# Patient Record
Sex: MALE | Race: WHITE | NOT HISPANIC OR LATINO | Employment: FULL TIME | URBAN - METROPOLITAN AREA
[De-identification: names, ages, dates, MRNs, and addresses within clinical notes are randomized per-mention and may not be internally consistent; named-entity substitution may affect disease eponyms.]

---

## 2019-12-09 RX ORDER — SODIUM, POTASSIUM,MAG SULFATES 17.5-3.13G
2 SOLUTION, RECONSTITUTED, ORAL ORAL TAKE AS DIRECTED
Qty: 354 ML | Refills: 0 | Status: SHIPPED | OUTPATIENT
Start: 2019-12-09 | End: 2019-12-10 | Stop reason: SDUPTHER

## 2019-12-10 RX ORDER — SODIUM, POTASSIUM,MAG SULFATES 17.5-3.13G
2 SOLUTION, RECONSTITUTED, ORAL ORAL TAKE AS DIRECTED
Qty: 354 ML | Refills: 0 | Status: SHIPPED | OUTPATIENT
Start: 2019-12-10

## 2019-12-20 ENCOUNTER — LAB REQUISITION (OUTPATIENT)
Dept: LAB | Facility: HOSPITAL | Age: 54
End: 2019-12-20

## 2019-12-20 ENCOUNTER — OUTSIDE FACILITY SERVICE (OUTPATIENT)
Dept: GASTROENTEROLOGY | Facility: CLINIC | Age: 54
End: 2019-12-20

## 2019-12-20 DIAGNOSIS — Z12.11 ENCOUNTER FOR SCREENING FOR MALIGNANT NEOPLASM OF COLON: ICD-10-CM

## 2019-12-20 PROCEDURE — 45385 COLONOSCOPY W/LESION REMOVAL: CPT | Performed by: INTERNAL MEDICINE

## 2019-12-20 PROCEDURE — 88305 TISSUE EXAM BY PATHOLOGIST: CPT | Performed by: INTERNAL MEDICINE

## 2019-12-23 LAB
CYTO UR: NORMAL
LAB AP CASE REPORT: NORMAL
LAB AP CLINICAL INFORMATION: NORMAL
PATH REPORT.FINAL DX SPEC: NORMAL
PATH REPORT.GROSS SPEC: NORMAL

## 2021-05-17 VITALS
BODY MASS INDEX: 33.86 KG/M2 | RESPIRATION RATE: 17 BRPM | TEMPERATURE: 99.5 F | WEIGHT: 250 LBS | OXYGEN SATURATION: 95 % | DIASTOLIC BLOOD PRESSURE: 122 MMHG | HEIGHT: 72 IN | HEART RATE: 78 BPM | SYSTOLIC BLOOD PRESSURE: 188 MMHG

## 2021-05-17 PROCEDURE — 99283 EMERGENCY DEPT VISIT LOW MDM: CPT

## 2021-05-17 RX ORDER — ASPIRIN 81 MG/1
81 TABLET, CHEWABLE ORAL DAILY
COMMUNITY

## 2021-05-18 ENCOUNTER — HOSPITAL ENCOUNTER (EMERGENCY)
Facility: HOSPITAL | Age: 56
Discharge: STILL ADMITTED OR ADMINISTRATIVE | End: 2021-05-18

## 2021-05-18 ENCOUNTER — HOSPITAL ENCOUNTER (OUTPATIENT)
Dept: CARDIOLOGY | Facility: HOSPITAL | Age: 56
Discharge: HOME | End: 2021-05-18
Admitting: PHYSICIAN ASSISTANT

## 2021-05-18 ENCOUNTER — HOSPITAL ENCOUNTER (EMERGENCY)
Facility: HOSPITAL | Age: 56
Discharge: HOME | End: 2021-05-18
Attending: EMERGENCY MEDICINE | Admitting: EMERGENCY MEDICINE

## 2021-05-18 DIAGNOSIS — Z86.718 HISTORY OF DVT (DEEP VEIN THROMBOSIS): ICD-10-CM

## 2021-05-18 DIAGNOSIS — M79.661 PAIN AND SWELLING OF RIGHT LOWER LEG: ICD-10-CM

## 2021-05-18 DIAGNOSIS — M79.89 PAIN AND SWELLING OF RIGHT LOWER LEG: ICD-10-CM

## 2021-05-18 DIAGNOSIS — M79.89 PAIN AND SWELLING OF RIGHT LOWER LEG: Primary | ICD-10-CM

## 2021-05-18 DIAGNOSIS — M79.661 PAIN AND SWELLING OF RIGHT LOWER LEG: Primary | ICD-10-CM

## 2021-05-18 LAB
BH CV LOWER VASCULAR LEFT COMMON FEMORAL AUGMENT: NORMAL
BH CV LOWER VASCULAR LEFT COMMON FEMORAL COMPRESS: NORMAL
BH CV LOWER VASCULAR LEFT COMMON FEMORAL PHASIC: NORMAL
BH CV LOWER VASCULAR LEFT COMMON FEMORAL SPONT: NORMAL
BH CV LOWER VASCULAR LEFT PROFUNDA FEMORAL AUGMENT: NORMAL
BH CV LOWER VASCULAR LEFT PROFUNDA FEMORAL COMPRESS: NORMAL
BH CV LOWER VASCULAR LEFT PROFUNDA FEMORAL PHASIC: NORMAL
BH CV LOWER VASCULAR LEFT PROFUNDA FEMORAL SPONT: NORMAL
BH CV LOWER VASCULAR RIGHT COMMON FEMORAL AUGMENT: NORMAL
BH CV LOWER VASCULAR RIGHT COMMON FEMORAL COMPRESS: NORMAL
BH CV LOWER VASCULAR RIGHT COMMON FEMORAL PHASIC: NORMAL
BH CV LOWER VASCULAR RIGHT COMMON FEMORAL SPONT: NORMAL
BH CV LOWER VASCULAR RIGHT DISTAL FEMORAL AUGMENT: NORMAL
BH CV LOWER VASCULAR RIGHT DISTAL FEMORAL COMPRESS: NORMAL
BH CV LOWER VASCULAR RIGHT DISTAL FEMORAL PHASIC: NORMAL
BH CV LOWER VASCULAR RIGHT DISTAL FEMORAL SPONT: NORMAL
BH CV LOWER VASCULAR RIGHT EXTERNAL ILIAC AUGMENT: NORMAL
BH CV LOWER VASCULAR RIGHT EXTERNAL ILIAC COMPETENT: NORMAL
BH CV LOWER VASCULAR RIGHT EXTERNAL ILIAC COMPRESS: NORMAL
BH CV LOWER VASCULAR RIGHT EXTERNAL ILIAC PHASIC: NORMAL
BH CV LOWER VASCULAR RIGHT EXTERNAL ILIAC SPONT: NORMAL
BH CV LOWER VASCULAR RIGHT GASTRONEMIUS COMPRESS: NORMAL
BH CV LOWER VASCULAR RIGHT GREATER SAPH AK COMPRESS: NORMAL
BH CV LOWER VASCULAR RIGHT GREATER SAPH BK COMPRESS: NORMAL
BH CV LOWER VASCULAR RIGHT LESSER SAPH COMPRESS: NORMAL
BH CV LOWER VASCULAR RIGHT MID FEMORAL AUGMENT: NORMAL
BH CV LOWER VASCULAR RIGHT MID FEMORAL COMPRESS: NORMAL
BH CV LOWER VASCULAR RIGHT MID FEMORAL PHASIC: NORMAL
BH CV LOWER VASCULAR RIGHT MID FEMORAL SPONT: NORMAL
BH CV LOWER VASCULAR RIGHT PERONEAL COMPRESS: NORMAL
BH CV LOWER VASCULAR RIGHT POPLITEAL AUGMENT: NORMAL
BH CV LOWER VASCULAR RIGHT POPLITEAL COMPRESS: NORMAL
BH CV LOWER VASCULAR RIGHT POPLITEAL PHASIC: NORMAL
BH CV LOWER VASCULAR RIGHT POPLITEAL SPONT: NORMAL
BH CV LOWER VASCULAR RIGHT POSTERIOR TIBIAL COMPRESS: NORMAL
BH CV LOWER VASCULAR RIGHT PROFUNDA FEMORAL AUGMENT: NORMAL
BH CV LOWER VASCULAR RIGHT PROFUNDA FEMORAL COMPRESS: NORMAL
BH CV LOWER VASCULAR RIGHT PROFUNDA FEMORAL PHASIC: NORMAL
BH CV LOWER VASCULAR RIGHT PROFUNDA FEMORAL SPONT: NORMAL
BH CV LOWER VASCULAR RIGHT PROXIMAL FEMORAL AUGMENT: NORMAL
BH CV LOWER VASCULAR RIGHT PROXIMAL FEMORAL COMPRESS: NORMAL
BH CV LOWER VASCULAR RIGHT PROXIMAL FEMORAL PHASIC: NORMAL
BH CV LOWER VASCULAR RIGHT PROXIMAL FEMORAL SPONT: NORMAL
BH CV LOWER VASCULAR RIGHT SAPHENOFEMORAL JUNCTION COMPRESS: NORMAL
HOLD SPECIMEN: NORMAL
MAXIMAL PREDICTED HEART RATE: 165 BPM
STRESS TARGET HR: 140 BPM
WHOLE BLOOD HOLD SPECIMEN: NORMAL
WHOLE BLOOD HOLD SPECIMEN: NORMAL

## 2021-05-18 PROCEDURE — 93971 EXTREMITY STUDY: CPT

## 2021-05-18 PROCEDURE — 96372 THER/PROPH/DIAG INJ SC/IM: CPT

## 2021-05-18 PROCEDURE — 25010000002 ENOXAPARIN PER 10 MG: Performed by: PHYSICIAN ASSISTANT

## 2021-05-18 PROCEDURE — 93971 EXTREMITY STUDY: CPT | Performed by: INTERNAL MEDICINE

## 2021-05-18 RX ADMIN — ENOXAPARIN SODIUM 110 MG: 120 INJECTION SUBCUTANEOUS at 01:58

## 2022-02-24 ENCOUNTER — TRANSCRIBE ORDERS (OUTPATIENT)
Dept: ADMINISTRATIVE | Facility: HOSPITAL | Age: 57
End: 2022-02-24
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
## 2022-03-04 ENCOUNTER — HOSPITAL ENCOUNTER (OUTPATIENT)
Dept: ULTRASOUND IMAGING | Facility: HOSPITAL | Age: 57
Discharge: HOME | End: 2022-03-04
Admitting: STUDENT IN AN ORGANIZED HEALTH CARE EDUCATION/TRAINING PROGRAM
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
## 2022-03-04 PROCEDURE — 76882 US LMTD JT/FCL EVL NVASC XTR: CPT

## 2024-12-05 RX ORDER — SODIUM, POTASSIUM,MAG SULFATES 17.5-3.13G
SOLUTION, RECONSTITUTED, ORAL ORAL
Qty: 354 ML | Refills: 0 | Status: SHIPPED | OUTPATIENT
Start: 2024-12-05

## 2024-12-20 ENCOUNTER — OUTSIDE FACILITY SERVICE (OUTPATIENT)
Dept: GASTROENTEROLOGY | Facility: CLINIC | Age: 59
End: 2024-12-20
Payer: COMMERCIAL

## 2024-12-20 PROCEDURE — 45378 DIAGNOSTIC COLONOSCOPY: CPT | Performed by: INTERNAL MEDICINE

## 2025-03-12 ENCOUNTER — TRANSCRIBE ORDERS (OUTPATIENT)
Dept: ADMINISTRATIVE | Facility: HOSPITAL | Age: 60
End: 2025-03-12
Payer: COMMERCIAL

## 2025-03-12 DIAGNOSIS — G47.30 SLEEP APNEA, UNSPECIFIED TYPE: ICD-10-CM

## 2025-03-12 DIAGNOSIS — R06.83 SNORING: Primary | ICD-10-CM

## 2025-03-18 ENCOUNTER — TELEPHONE (OUTPATIENT)
Dept: CARDIOLOGY | Facility: CLINIC | Age: 60
End: 2025-03-18
Payer: COMMERCIAL

## 2025-03-18 NOTE — TELEPHONE ENCOUNTER
Caller: Jesus Pulido    Relationship: Self    Best call back number: [phone]     What is the best time to reach you:  ANYTIME    What was the call regarding:  PT IS WANTING TO KNOW WHEN HE WILL RECEIVE A NEW PT PACKET BEFORE HIS APPOINTMENT.     Is it okay if the provider responds through [employer]: PLEASE CALL PT, THANK YOU     
SPOKE WITH PT TO LET HIM NO THAT THE PAPERWORK WAS SENT OUT ON THUR MORNING MARCH 13TH.   
What Type Of Note Output Would You Prefer (Optional)?: Standard Output
Hpi Title: Evaluation of Skin Lesions
How Severe Are Your Spot(S)?: mild
Have Your Spot(S) Been Treated In The Past?: has not been treated

## 2025-03-25 PROBLEM — M10.9 GOUT, UNSPECIFIED: Status: ACTIVE | Noted: 2025-03-25

## 2025-03-25 PROBLEM — E78.5 HYPERLIPIDEMIA LDL GOAL <70: Status: ACTIVE | Noted: 2025-03-25

## 2025-03-25 PROBLEM — I10 ESSENTIAL HYPERTENSION: Status: ACTIVE | Noted: 2025-03-25

## 2025-03-25 PROBLEM — E78.41 ELEVATED LIPOPROTEIN(A): Status: ACTIVE | Noted: 2025-03-25

## 2025-03-25 PROBLEM — G47.30 SLEEP APNEA: Status: ACTIVE | Noted: 2025-03-25

## 2025-03-25 PROBLEM — Z90.3 HISTORY OF SLEEVE GASTRECTOMY: Status: ACTIVE | Noted: 2025-03-25

## 2025-03-25 PROBLEM — Z86.718 HISTORY OF DVT (DEEP VEIN THROMBOSIS): Status: ACTIVE | Noted: 2025-03-25

## 2025-03-27 ENCOUNTER — SPECIALTY PHARMACY (OUTPATIENT)
Dept: CARDIOLOGY | Facility: CLINIC | Age: 60
End: 2025-03-27
Payer: COMMERCIAL

## 2025-03-27 ENCOUNTER — OFFICE VISIT (OUTPATIENT)
Dept: CARDIOLOGY | Facility: CLINIC | Age: 60
End: 2025-03-27
Payer: COMMERCIAL

## 2025-03-27 ENCOUNTER — TELEPHONE (OUTPATIENT)
Dept: CARDIOLOGY | Facility: CLINIC | Age: 60
End: 2025-03-27

## 2025-03-27 VITALS
SYSTOLIC BLOOD PRESSURE: 120 MMHG | OXYGEN SATURATION: 97 % | WEIGHT: 255 LBS | HEIGHT: 70 IN | HEART RATE: 70 BPM | BODY MASS INDEX: 36.51 KG/M2 | DIASTOLIC BLOOD PRESSURE: 78 MMHG

## 2025-03-27 DIAGNOSIS — E78.2 MIXED HYPERLIPIDEMIA: ICD-10-CM

## 2025-03-27 DIAGNOSIS — E78.41 ELEVATED LIPOPROTEIN(A): Primary | ICD-10-CM

## 2025-03-27 DIAGNOSIS — I10 ESSENTIAL HYPERTENSION: ICD-10-CM

## 2025-03-27 PROCEDURE — 93000 ELECTROCARDIOGRAM COMPLETE: CPT | Performed by: INTERNAL MEDICINE

## 2025-03-27 PROCEDURE — 99204 OFFICE O/P NEW MOD 45 MIN: CPT | Performed by: INTERNAL MEDICINE

## 2025-03-27 RX ORDER — ATORVASTATIN CALCIUM 80 MG/1
40 TABLET, FILM COATED ORAL DAILY
COMMUNITY
Start: 2025-03-10

## 2025-03-27 RX ORDER — LISINOPRIL 10 MG/1
1 TABLET ORAL DAILY
COMMUNITY
Start: 2025-03-10

## 2025-03-27 RX ORDER — MULTIPLE VITAMINS W/ MINERALS TAB 9MG-400MCG
1 TAB ORAL DAILY
COMMUNITY

## 2025-03-27 NOTE — PROGRESS NOTES
OFFICE VISIT  NOTE  [facility] CARDIOLOGY      Name: Jesus Pulido    Date: 3/27/2025  MRN:  [id number]  :  1965      REFERRING/PRIMARY PROVIDER:  Victor Manuel Asher MD    Chief Complaint   Patient presents with    Hyperlipidemia       HPI: Jesus Pulido is a 60 y.o. male who presents for elevated LP(a), mixed hyperlipidemia, and hypertension.  Patient of Dr. Asher's.  Recent had lipid fractionation, found to have pattern B with apolipoprotein B of 89 which is reasonable but he did have a markedly elevated LP(a) at 180 normals less than 75.  Patient reports family history of premature CAD his father had stents in his 50s.  Patient exercises 3-4 times a week with no angina or shortness of breath.  Tolerating atorvastatin 80 mg daily recently increased has been on atorvastatin for at least 3 years.    Past Medical History:   Diagnosis Date    DVT (deep venous thrombosis)     Gout attack     Pulmonary emboli        Past Surgical History:   Procedure Laterality Date    GASTRIC SLEEVE LAPAROSCOPIC         Social History     Socioeconomic History    Marital status:    Tobacco Use    Smoking status: Never    Smokeless tobacco: Never   Vaping Use    Vaping status: Never Used    Passive vaping exposure: Yes   Substance and Sexual Activity    Alcohol use: Yes     Comment: OCC    Drug use: Never    Sexual activity: Defer       Family History   Problem Relation Age of Onset    Alzheimer's disease Mother     Heart disease Father     Cancer Father     Diabetes Father         ROS:   Constitutional no fever,  no weight loss   Skin no rash, no subcutaneous nodules   Otolaryngeal no difficulty swallowing   Cardiovascular See HPI   Pulmonary no cough, no sputum production   Gastrointestinal no constipation, no diarrhea   Genitourinary no dysuria, no hematuria   Hematologic no easy bruisability, no abnormal bleeding   Musculoskeletal no muscle pain   Neurologic no dizziness, no  "falls         No Known Allergies      Current Outpatient Medications:     aspirin 81 MG chewable tablet, Chew 1 tablet Daily., Disp: , Rfl:     atorvastatin (LIPITOR) 80 MG tablet, Take 1 tablet by mouth Daily., Disp: , Rfl:     lisinopril (PRINIVIL,ZESTRIL) 10 MG tablet, Take 1 tablet by mouth Daily., Disp: , Rfl:     multivitamin with minerals tablet tablet, Take 1 tablet by mouth Daily., Disp: , Rfl:     Vitals:    03/27/25 1055   BP: 120/78   BP Location: Left arm   Patient Position: Sitting   Pulse: 70   SpO2: 97%   Weight: 116 kg (255 lb)   Height: 177.8 cm (70\")     Body mass index is 36.59 kg/m².    PHYSICAL EXAM:    General Appearance:   well developed  well nourished  HENT:   oropharynx moist  lips not cyanotic  Neck:  thyroid not enlarged  supple  Respiratory:  no respiratory distress  normal breath sounds  no rales  Cardiovascular:  no jugular venous distention  regular rhythm  apical impulse normal  S1 normal, S2 normal  no S3, no S4   no murmur  no rub, no thrill  carotid pulses normal; no bruit  lower extremity edema: none      Musculoskeletal:  no clubbing of fingers.   normocephalic, head atraumatic  Skin:   warm, dry  Psychiatric:  judgement and insight appropriate  normal mood and affect    RESULTS:     ECG 12 Lead    Date/Time: 3/27/2025 11:38 AM  Performed by: Sukhi Dominguez MD    Authorized by: Sukhi Dominguez MD  Comparison: not compared with previous ECG   Previous ECG: no previous ECG available  Rhythm: sinus rhythm and sinus arrhythmia  Rate: normal  BPM: 70  QRS axis: normal    Clinical impression: normal ECG                Labs:  No results found for: \"CHOL\", \"TRIG\", \"HDL\", \"LDL\", \"AST\", \"ALT\"  No results found for: \"HGBA1C\"  No components found for: \"CREATINININE\"  No results found for: \"EGFRIFNONA\"    Most recent PCP note, imaging tests, and labs reviewed.    ASSESSMENT:  Problem List Items Addressed This Visit       Mixed hyperlipidemia    Relevant Medications    atorvastatin " (LIPITOR) 80 MG tablet    Elevated lipoprotein(a) - Primary    Overview   - 180         Relevant Medications    atorvastatin (LIPITOR) 80 MG tablet    Essential hypertension    Relevant Medications    lisinopril (PRINIVIL,ZESTRIL) 10 MG tablet       PLAN:    1.  Elevated LP(a):  Repatha will be prescribed will discuss with our specialty pharmacy about coverage  If Repatha is approved, then I would decrease atorvastatin to 40 mg daily    2.  Hyperlipidemia:  Continue atorvastatin, plans as per #1 if Repatha approved we will decrease atorvastatin to 40 mg daily  Check coronary calcium score    3.  Hypertension:  Goal blood pressure less than 130/80  Well-controlled on current regimen we will continue lisinopril.  Low-sodium diet and exercise as well.      Return to clinic in 12 months, or sooner as needed.    Thank you for the opportunity to share in the care of your patient; please do not hesitate to call me with any questions.     Sukhi Dominguez MD, [facility], [facility]  Office: [phone] [address] [place] [address]    03/27/25

## 2025-03-31 ENCOUNTER — SPECIALTY PHARMACY (OUTPATIENT)
Facility: HOSPITAL | Age: 60
End: 2025-03-31
Payer: COMMERCIAL

## 2025-03-31 NOTE — PROGRESS NOTES
Specialty Pharmacy Patient Management Program  Cardiology Initial Assessment     Jesus Pulido was referred by a Cardiology provider to the Cardiology Patient Management program offered by [facility] for Hyperlipidemia on 03/31/25.  An initial outreach was conducted, including assessment of therapy appropriateness and specialty medication education for Repatha. The patient was introduced to services offered by [facility], including: regular assessments, refill coordination, mail order delivery options, prior authorization maintenance, and financial assistance programs as applicable. The patient was also provided with contact information for the pharmacy team.     Insurance Coverage & Financial Support  [employer] & Copay card     Relevant Past Medical History and Comorbidities  Relevant medical history and concomitant health conditions were discussed with the patient. The patient's chart has been reviewed for relevant past medical history and comorbid conditions and updated as necessary.  Past Medical History:   Diagnosis Date    DVT (deep venous thrombosis) 2015    Gout attack     Pulmonary emboli 2015     Social History     Socioeconomic History    Marital status:    Tobacco Use    Smoking status: Never    Smokeless tobacco: Never   Vaping Use    Vaping status: Never Used    Passive vaping exposure: Yes   Substance and Sexual Activity    Alcohol use: Yes     Comment: OCC    Drug use: Never    Sexual activity: Defer       Problem list reviewed by Lucina Hoff, PharmD on 3/31/2025 at 10:00 AM    Allergies  Known allergies and reactions were discussed with the patient. The patient's chart has been reviewed for  allergy information and updated as necessary.   No Known Allergies    Allergies reviewed by Lucina Hoff, PharmD on 3/31/2025 at 10:00 AM    Relevant Laboratory Values  Relevant laboratory values were discussed with the patient. The  "following specialty medication dose adjustment(s) are recommended: N/A    No results found for: \"GLUCOSE\", \"CALCIUM\", \"NA\", \"K\", \"CO2\", \"CL\", \"BUN\", \"CREATININE\", \"EGFRIFAFRI\", \"EGFRIFNONA\", \"BCR\", \"ANIONGAP\"  No results found for: \"CHOL\", \"CHLPL\", \"TRIG\", \"HDL\", \"LDL\", \"LDLDIRECT\"      Current Medication List  This medication list has been reviewed with the patient and evaluated for any interactions or necessary modifications/recommendations, and updated to include all prescription medications, OTC medications, and supplements the patient is currently taking.  This list reflects what is contained in the patient's profile, which has also been marked as reviewed to communicate to other providers it is the most up to date version of the patient's current medication therapy.     Current Outpatient Medications:     Evolocumab (REPATHA) solution auto-injector SureClick injection, Inject 1 mL under the skin into the appropriate area as directed Every 14 (Fourteen) Days., Disp: 2 mL, Rfl: 11    aspirin 81 MG chewable tablet, Chew 1 tablet Daily., Disp: , Rfl:     atorvastatin (LIPITOR) 80 MG tablet, Take 0.5 tablets by mouth Daily., Disp: , Rfl:     lisinopril (PRINIVIL,ZESTRIL) 10 MG tablet, Take 1 tablet by mouth Daily., Disp: , Rfl:     multivitamin with minerals tablet tablet, Take 1 tablet by mouth Daily., Disp: , Rfl:     Medicines reviewed by Lucina Hoff, PharmD on 3/31/2025 at 10:00 AM    Drug Interactions  None    Initial Education Provided for Specialty Medication  The patient has been provided with the following education and any applicable administration techniques (i.e. self-injection) have been demonstrated for the therapies indicated. All questions and concerns have been addressed prior to the patient receiving the medication, and the patient has verbalized comprehension of the education and any materials provided. Additional patient education shall be provided and documented upon request by the " patient, provider, or payer.    REPATHA® (evolocumab)  Medication Expectations   Why am I taking this medication? You are taking Repatha to lower your “bad” cholesterol (LDL-C). This medication can be used in adults with high blood cholesterol including primary hyperlipidemia and familial hypercholesterolemia.    What should I expect while on this medication? You should expect to see your cholesterol improve over time. Specifically, you should see your LDL-C decrease.    How does the medication work? Repatha works by blocking a protein called PCSK9 that contributes to high levels of bad cholesterol. It helps increase your liver's ability to remove bad cholesterol from your blood.     How long will I be on this medication for? The amount of time you will be on this medication will be determined by your doctor based on your cholesterol and/or your risk of having a cardiac event. You will most likely be on this medication or another cholesterol medication throughout your lifetime. Do not abruptly stop this medication without talking to your doctor first.    How do I take this medication? Take as directed on your prescription label. Repatha is injected under the skin (subcutaneously) of your stomach, thigh, or upper arm. This medication is usually given one or twice a month.   What are some possible side effects? The most common side effects of Repatha include redness, itching, swelling, or pain/tenderness at the injection site, symptoms of the common cold, flu or flu-like symptoms or back pain.    What happens if I miss a dose? If you miss a dose, take it as soon as you remember if it is within 7 days from the usual day of administration then resume your original schedule. If it is beyond 7 days and you use the samuel-2-week dose, skip the missed dose and resume your normal dosing schedule.If it is beyond 7 days and you use the once-monthly dose, inject the dose and start a new schedule based on that date.       Medication Safety   What are things I should warn my doctor immediately about? Talk to your doctor if you are pregnant, planning to become pregnant, or breastfeeding. Stop the medication and tell your doctor or seek emergency medical help if you notice any signs/symptoms of an allergic reaction (severe rash, redness, hives, severe itching, trouble breathing, or swelling of the face, lips, or tongue). If you have a rubber or latex allergy, you should not use the Repatha SureClick® Autoinjector pen or the prefilled syringe, please notify your doctor or pharmacist.   What are things that I should be cautious of? Be cautious of any side effects from this medication. Talk to your doctor if any new ones develop or aren't getting better.   What are some medications that can interact with this one? There are no known significant drug interactions with Repatha. Always tell your doctor or pharmacist immediately if you start taking any new medications, including over-the-counter medications, vitamins, and herbal supplements.      Medication Storage/Handling   How should I handle this medication? Do not shake or expose the pens, cartridges, or syringes to extreme heat or direct sunlight. Keep this medication out of reach of pets/children. Allow medication to warm at room temperature prior to administration.   How does this medication need to be stored? Store unused pens, cartridges, or syringes in the refrigerator in the original cartons to protect from light. If needed, Repatha may be kept at room temperature in the original carton for up to 30 days. Do not freeze.    How should I dispose of this medication? All the Repatha devices are single-dose and should be discarded in a sharps container after use. If your doctor decides to stop this medication, take to your local police station for proper disposal. Some pharmacies also have take-back bins for medication drop-off.      Resources/Support   How can I remind myself to  "take this medication? You can download reminder apps to help you manage your refills. You may also set an alarm on your phone to remind you to take your dose.    Is financial support available?  [employer] can provide co-pay cards if you have commercial insurance or patient assistance if you have Medicare or no insurance.    Which vaccines are recommended for me? Talk to your doctor about these vaccines: Flu, Coronavirus (COVID-19), Pneumococcal (pneumonia), Tdap, Hepatitis B, Zoster (shingles)          Adherence and Self-Administration  Adherence related to the patient's specialty therapy was discussed with the patient. The Adherence segment of this outreach has been reviewed and updated.     Is there a concern with patient's ability to self administer the medication correctly and without issue?: No  Were any potential barriers to adherence identified during the initial assessment or patient education?: No  Are there any concerns regarding the patient's understanding of the importance of medication adherence?: No  Methods for Supporting Patient Adherence and/or Self-Administration: None    Open Medication Therapy Problems  No medication therapy recommendations to display    Goals of Therapy  Goals related to the patient's specialty therapy were discussed with the patient. The Patient Goals segment of this outreach has been reviewed and updated.   Goals Addressed Today        Specialty Pharmacy General Goal      LDL Goal < 100 mg/dL    No results found for: \"LDL\"               Reassessment Plan & Follow-Up  1. Medication Therapy Changes: Begin Repatha 140mg subcutaneous every 14 days   2. Related Plans, Therapy Recommendations, or Therapy Problems to Be Addressed: He will decrease atorvastatin to 40mg daily per provider. He recently received a 90-day supply of atorvastatin and will cut these in half for now; he will reach out when he runs low on his medication  3. Pharmacist to perform regular assessments no more than " (6) months from the previous assessment.  4. Care Coordinator to set up future refill outreaches, coordinate prescription delivery, and escalate clinical questions to pharmacist.  5. Welcome information and patient satisfaction survey to be sent by specialty pharmacy team with patient's initial fill.    Attestation  Therapeutic appropriateness: Appropriate   I attest the patient was actively involved in and has agreed to the above plan of care. If the prescribed therapy is at any point deemed not appropriate based on the current or future assessments, a consultation will be initiated with the patient's specialty care provider to determine the best course of action. The revised plan of therapy will be documented along with any required assessments and/or additional patient education provided.     Lucina Hoff, Marlen, BCPS  Clinical Specialty Pharmacist, Cardiology  3/31/2025  10:03 EDT

## 2025-04-29 ENCOUNTER — SPECIALTY PHARMACY (OUTPATIENT)
Dept: CARDIOLOGY | Facility: CLINIC | Age: 60
End: 2025-04-29
Payer: COMMERCIAL

## 2025-04-29 NOTE — PROGRESS NOTES
Specialty Pharmacy Patient Management Program  Cardiology Specialty Pharmacy Refill Outreach      Jesus is a 60 y.o. male contacted today regarding refills of his medication(s).    Specialty medication(s) and dose(s) confirmed: Repatha  Other medications being refilled: n/a    Refill Questions      Flowsheet Row Most Recent Value   Changes to allergies? No   Changes to medications? No   New conditions or infections since last clinic visit No   Unplanned office visit, urgent care, ED, or hospital admission in the last 4 weeks  No   How does patient/caregiver feel medication is working? Very good   Financial problems or insurance changes  No   Since the previous refill, were any specialty medication doses or scheduled injections missed or delayed?  No   Does this patient require a clinical escalation to a pharmacist? No            Delivery Questions      Flowsheet Row Most Recent Value   Delivery method UPS   Delivery address verified with patient/caregiver? Yes   Delivery address Home   Number of medications in delivery 1   Medication(s) being filled and delivered Evolocumab (REPATHA)   Doses left of specialty medications 1   Copay verified? Yes   Copay amount $15   Copay form of payment Credit/debit on file   Delivery Date Selection 05/01/25   Signature Required No   Do you consent to receive electronic handouts?  No                   Follow-up: 21 days     Harsh Gayle CPhT-Adv  Pharmacy Care Coordinator  4/29/2025  14:52 EDT

## 2025-05-29 ENCOUNTER — TELEPHONE (OUTPATIENT)
Dept: CARDIOLOGY | Facility: CLINIC | Age: 60
End: 2025-05-29
Payer: COMMERCIAL

## 2025-05-29 ENCOUNTER — SPECIALTY PHARMACY (OUTPATIENT)
Dept: CARDIOLOGY | Facility: CLINIC | Age: 60
End: 2025-05-29
Payer: COMMERCIAL

## 2025-05-29 NOTE — PROGRESS NOTES
Specialty Pharmacy Patient Management Program  Cardiology Specialty Pharmacy Refill Outreach      Jesus is a 60 y.o. male contacted today regarding refills of his medication(s).    Specialty medication(s) and dose(s) confirmed: Repatha  Other medications being refilled: n/a    Refill Questions      Flowsheet Row Most Recent Value   Changes to allergies? No   Changes to medications? No   New conditions or infections since last clinic visit No   Unplanned office visit, urgent care, ED, or hospital admission in the last 4 weeks  No   How does patient/caregiver feel medication is working? Very good   Financial problems or insurance changes  No   Since the previous refill, were any specialty medication doses or scheduled injections missed or delayed?  No   Does this patient require a clinical escalation to a pharmacist? No            Delivery Questions      Flowsheet Row Most Recent Value   Delivery method UPS   Delivery address verified with patient/caregiver? Yes   Delivery address Home   Number of medications in delivery 1   Medication(s) being filled and delivered Evolocumab (REPATHA)   Doses left of specialty medications 0   Copay verified? Yes   Copay amount $15   Copay form of payment Credit/debit on file   Delivery Date Selection 05/30/25   Signature Required No   Do you consent to receive electronic handouts?  No                   Follow-up: 21 days     Harsh Gayle CPhT-Adv  Pharmacy Care Coordinator  5/29/2025  11:59 EDT

## 2025-06-16 ENCOUNTER — OFFICE VISIT (OUTPATIENT)
Dept: SLEEP MEDICINE | Age: 60
End: 2025-06-16
Payer: COMMERCIAL

## 2025-06-16 VITALS
TEMPERATURE: 98.7 F | OXYGEN SATURATION: 96 % | HEIGHT: 70 IN | WEIGHT: 249 LBS | SYSTOLIC BLOOD PRESSURE: 112 MMHG | BODY MASS INDEX: 35.65 KG/M2 | HEART RATE: 86 BPM | DIASTOLIC BLOOD PRESSURE: 78 MMHG

## 2025-06-16 DIAGNOSIS — G47.33 OBSTRUCTIVE SLEEP APNEA, ADULT: Primary | ICD-10-CM

## 2025-06-16 DIAGNOSIS — E66.9 OBESITY (BMI 30-39.9): ICD-10-CM

## 2025-06-16 DIAGNOSIS — G47.19 EXCESSIVE DAYTIME SLEEPINESS: ICD-10-CM

## 2025-06-16 DIAGNOSIS — R06.83 SNORING: ICD-10-CM

## 2025-06-16 PROCEDURE — 99204 OFFICE O/P NEW MOD 45 MIN: CPT | Performed by: INTERNAL MEDICINE

## 2025-06-16 RX ORDER — ALLOPURINOL 300 MG/1
TABLET ORAL EVERY 24 HOURS
COMMUNITY
Start: 2025-06-03 | End: 2026-08-27

## 2025-06-16 NOTE — PROGRESS NOTES
Jesus Pulido is a 60 y.o. male.   Chief Complaint   Patient presents with    Snoring    Daytime Sleepiness    Witnessed Apnea    Frequent Awakenings    Non-restorative Sleep    Heartburn    Dry Mouth    Leg/body Jerks During Sleep       HPI     60 y.o. male seen in consultation at the request of Victor Manuel Asher MD for evaluation of the above.     He has been told by his wife that he snores and has apneas at times durin the night.  She also noticed that he twitches during the night.    From his standpoint he feels less rested than historically.  He notes tiredness and fatigue during the day.  This has been present for several years.    He thinks he averages about 7-8 hours of sleep per night.  He fall asleep within a few minutes and will awaken anywhere from 1-8 times through the night.    He typically does not nap during the day.    He wakes with a dry mouth and sore throat.  He has no morning headaches.  He has no RLS symptoms or parasomnias.    His past medical history is significant for dyslipidemia, hypertension, and gout.    Rhodell Scale is: 9/24    The patient's relevant past medical, surgical, family, and social history reviewed and updated in Epic as appropriate.    Current medications are:   Current Outpatient Medications:     allopurinol (ZYLOPRIM) 300 MG tablet, Daily., Disp: , Rfl:     aspirin 81 MG chewable tablet, Chew 1 tablet Daily., Disp: , Rfl:     atorvastatin (LIPITOR) 80 MG tablet, Take 0.5 tablets by mouth Daily., Disp: , Rfl:     Evolocumab (REPATHA) solution auto-injector SureClick injection, Inject 1 mL under the skin into the appropriate area as directed Every 14 (Fourteen) Days., Disp: 2 mL, Rfl: 11    lisinopril (PRINIVIL,ZESTRIL) 10 MG tablet, Take 1 tablet by mouth Daily., Disp: , Rfl:     multivitamin with minerals tablet tablet, Take 1 tablet by mouth Daily., Disp: , Rfl: .    Review of Systems    Review of Systems  ROS documented in patient questionnaire ×14 systems.   "Otherwise negative except as noted in HPI.    Physical Exam    Blood pressure 112/78, pulse 86, temperature 98.7 °F (37.1 °C), temperature source Infrared, height 177.8 cm (70\"), weight 113 kg (249 lb), SpO2 96%. Body mass index is 35.73 kg/m².    Physical Exam  Vitals and nursing note reviewed.   Constitutional:       Appearance: Normal appearance. He is well-developed.   HENT:      Head: Normocephalic and atraumatic.      Nose: Nose normal.      Mouth/Throat:      Mouth: Mucous membranes are moist.      Pharynx: Oropharynx is clear. No oropharyngeal exudate.      Comments: Class III airway  Eyes:      General: No scleral icterus.     Conjunctiva/sclera: Conjunctivae normal.   Neck:      Thyroid: No thyromegaly.      Trachea: No tracheal deviation.   Cardiovascular:      Rate and Rhythm: Normal rate and regular rhythm.      Heart sounds: No murmur heard.     No friction rub. No gallop.   Pulmonary:      Effort: Pulmonary effort is normal. No respiratory distress.      Breath sounds: No wheezing or rales.   Musculoskeletal:         General: No deformity. Normal range of motion.   Skin:     General: Skin is warm and dry.      Findings: No rash.   Neurological:      Mental Status: He is alert and oriented to person, place, and time.   Psychiatric:         Behavior: Behavior normal.         Thought Content: Thought content normal.         DATA:    Reviewed 3/11/2025 note from Dr. Asher    Reviewed bed partner questionnaire    ASSESSMENT:    Problem List Items Addressed This Visit    None  Visit Diagnoses         Obstructive sleep apnea, adult    -  Primary    Relevant Orders    Home Sleep Study      Snoring        Relevant Orders    Home Sleep Study      Excessive daytime sleepiness        Relevant Orders    Home Sleep Study      Obesity (BMI 30-39.9)                60-year-old male referred to the sleep center by Dr. Asher.  He presents with snoring, witnessed apneas, frequent nocturnal awakenings, nonrestorative " sleep/daytime somnolence, dry mouth, and sore throat in the morning.  He has an elevated BMI and class III airway.    Based on the above he is at high risk for the presence of obstructive sleep apnea.  I went over the potential diagnosis as well as the potential long-term effects of untreated sleep apnea.  I recommended a diagnostic process and went over treatment options.  All questions were answered.  He was agreeable to proceed as I have noted.    PLAN:    - Home sleep apnea testing.  - Diagnostic process and potential treatment options discussed and questions/concerns addressed.  - Long-term cardiovascular and metabolic risks of untreated obstructive sleep apnea reviewed with the patient.  - The importance of long-term healthy weight loss discussed.  - Patient was agreeable to a trial of CPAP therapy on an initial trial basis if recommended after testing complete.  - Sleep center follow-up.    I have reviewed the results of my evaluation and impression and discussed my recommendations in detail with the patient.    Signed by  Hal Valadez MD    June 16, 2025      CC: Victor Manuel Asher MD Nesbitt, Douglas J, MD

## 2025-06-23 ENCOUNTER — SPECIALTY PHARMACY (OUTPATIENT)
Facility: HOSPITAL | Age: 60
End: 2025-06-23
Payer: COMMERCIAL

## 2025-06-23 NOTE — PROGRESS NOTES
Specialty Pharmacy Patient Management Program  Cardiology Specialty Pharmacy Refill Outreach      Jesus is a 60 y.o. male contacted today regarding refills of his medication(s).    Specialty medication(s) and dose(s) confirmed: Repatha  Other medications being refilled: N/A    Refill Questions      Flowsheet Row Most Recent Value   Changes to allergies? No   Changes to medications? No   New conditions or infections since last clinic visit No   Unplanned office visit, urgent care, ED, or hospital admission in the last 4 weeks  No   How does patient/caregiver feel medication is working? Very good   Financial problems or insurance changes  No   Since the previous refill, were any specialty medication doses or scheduled injections missed or delayed?  No   Does this patient require a clinical escalation to a pharmacist? No            Delivery Questions      Flowsheet Row Most Recent Value   Delivery method [employer]   Delivery address verified with patient/caregiver? Yes   Delivery address Home   Number of medications in delivery 1   Medication(s) being filled and delivered Evolocumab (REPATHA)   Copay verified? Yes   Copay amount $15.00   Copay form of payment Credit/debit on file   Delivery Date Selection 06/25/25   Signature Required No   Do you consent to receive electronic handouts?  No                   Follow-up: 21 days     Jessica Mi CPhT  Pharmacy Care Coordinator  6/23/2025  15:11 EDT

## 2025-07-14 ENCOUNTER — HOSPITAL ENCOUNTER (OUTPATIENT)
Dept: SLEEP MEDICINE | Facility: HOSPITAL | Age: 60
Discharge: HOME | End: 2025-07-14
Admitting: INTERNAL MEDICINE
Payer: COMMERCIAL

## 2025-07-14 VITALS — HEIGHT: 70 IN | BODY MASS INDEX: 35.65 KG/M2 | WEIGHT: 249 LBS

## 2025-07-14 DIAGNOSIS — G47.33 OBSTRUCTIVE SLEEP APNEA, ADULT: ICD-10-CM

## 2025-07-14 DIAGNOSIS — R06.83 SNORING: ICD-10-CM

## 2025-07-14 DIAGNOSIS — G47.19 EXCESSIVE DAYTIME SLEEPINESS: ICD-10-CM

## 2025-07-14 PROCEDURE — 95800 SLP STDY UNATTENDED: CPT

## 2025-07-16 DIAGNOSIS — G47.33 SEVERE OBSTRUCTIVE SLEEP APNEA: Primary | ICD-10-CM

## 2025-07-17 ENCOUNTER — SPECIALTY PHARMACY (OUTPATIENT)
Dept: CARDIOLOGY | Facility: CLINIC | Age: 60
End: 2025-07-17
Payer: COMMERCIAL

## 2025-07-17 ENCOUNTER — TELEPHONE (OUTPATIENT)
Dept: SLEEP MEDICINE | Age: 60
End: 2025-07-17
Payer: COMMERCIAL

## 2025-07-17 NOTE — TELEPHONE ENCOUNTER
Patient is understanding of their sleep study results and is agreeable to PAP therapy. They would like to use [employer]. Orders have been faxed to DME. Patient's compliance appointment has also been made.

## 2025-07-17 NOTE — PROGRESS NOTES
Specialty Pharmacy Patient Management Program  Cardiology Specialty Pharmacy Refill Outreach      Jesus is a 60 y.o. male contacted today regarding refills of his medication(s).    Specialty medication(s) and dose(s) confirmed: Repatha  Other medications being refilled: N/A    Refill Questions      Flowsheet Row Most Recent Value   Changes to allergies? No   Changes to medications? No   New conditions or infections since last clinic visit No   Unplanned office visit, urgent care, ED, or hospital admission in the last 4 weeks  No   How does patient/caregiver feel medication is working? Very good   Financial problems or insurance changes  No   Does this patient require a clinical escalation to a pharmacist? No            Delivery Questions      Flowsheet Row Most Recent Value   Delivery method UPS   Delivery address verified with patient/caregiver? Yes   Delivery address Home   Number of medications in delivery 1   Medication(s) being filled and delivered Evolocumab (REPATHA)   Copay verified? Yes   Copay amount $15.00   Copay form of payment Credit/debit on file   Delivery Date Selection 07/22/25   Signature Required No   Do you consent to receive electronic handouts?  No                   Follow-up: 21 Days     Jessica Mi CPhT  Pharmacy Care Coordinator  7/17/2025  13:54 EDT

## 2025-08-13 ENCOUNTER — SPECIALTY PHARMACY (OUTPATIENT)
Dept: CARDIOLOGY | Facility: CLINIC | Age: 60
End: 2025-08-13
Payer: COMMERCIAL